# Patient Record
Sex: MALE | Race: BLACK OR AFRICAN AMERICAN | Employment: FULL TIME | URBAN - METROPOLITAN AREA
[De-identification: names, ages, dates, MRNs, and addresses within clinical notes are randomized per-mention and may not be internally consistent; named-entity substitution may affect disease eponyms.]

---

## 2018-02-07 ENCOUNTER — HOSPITAL ENCOUNTER (EMERGENCY)
Age: 28
Discharge: HOME | End: 2018-02-07
Attending: EMERGENCY MEDICINE
Payer: SELF-PAY

## 2018-02-07 VITALS
OXYGEN SATURATION: 100 % | RESPIRATION RATE: 18 BRPM | TEMPERATURE: 98.6 F | SYSTOLIC BLOOD PRESSURE: 120 MMHG | DIASTOLIC BLOOD PRESSURE: 75 MMHG | BODY MASS INDEX: 19.09 KG/M2 | HEIGHT: 78 IN | WEIGHT: 165 LBS | HEART RATE: 83 BPM

## 2018-02-07 DIAGNOSIS — J06.9 VIRAL URI WITH COUGH: Primary | ICD-10-CM

## 2018-02-07 PROCEDURE — 99282 EMERGENCY DEPT VISIT SF MDM: CPT

## 2018-02-07 RX ORDER — ACETAMINOPHEN 325 MG/1
650 TABLET ORAL
Qty: 20 TAB | Refills: 0 | Status: SHIPPED | OUTPATIENT
Start: 2018-02-07 | End: 2019-11-04

## 2018-02-07 RX ORDER — BENZONATATE 100 MG/1
100 CAPSULE ORAL
Qty: 12 CAP | Refills: 0 | Status: SHIPPED | OUTPATIENT
Start: 2018-02-07 | End: 2018-02-14

## 2018-02-07 RX ORDER — LIDOCAINE HYDROCHLORIDE 20 MG/ML
SOLUTION OROPHARYNGEAL
Qty: 200 ML | Refills: 0 | Status: SHIPPED | OUTPATIENT
Start: 2018-02-07 | End: 2019-11-04

## 2018-02-07 RX ORDER — IBUPROFEN 800 MG/1
800 TABLET ORAL
Qty: 20 TAB | Refills: 0 | Status: SHIPPED | OUTPATIENT
Start: 2018-02-07 | End: 2018-02-14

## 2018-02-07 NOTE — ED NOTES
Current Discharge Medication List      START taking these medications    Details   benzonatate (TESSALON PERLES) 100 mg capsule Take 1 Cap by mouth three (3) times daily as needed for Cough for up to 7 days. Qty: 12 Cap, Refills: 0      ibuprofen (MOTRIN) 800 mg tablet Take 1 Tab by mouth every six (6) hours as needed for Pain for up to 7 days. Qty: 20 Tab, Refills: 0      acetaminophen (TYLENOL) 325 mg tablet Take 2 Tabs by mouth every four (4) hours as needed for Pain. Qty: 20 Tab, Refills: 0      lidocaine (LIDOCAINE VISCOUS) 2 % solution Put 10 mL in mouth and swish and spit out QAC and at bedtime  Qty: 200 mL, Refills: 0           Patient armband removed and shredded  Prescriptions given and reviewed with patient.

## 2018-02-07 NOTE — DISCHARGE INSTRUCTIONS
Upper Respiratory Infection (Cold): Care Instructions  Your Care Instructions    An upper respiratory infection, or URI, is an infection of the nose, sinuses, or throat. URIs are spread by coughs, sneezes, and direct contact. The common cold is the most frequent kind of URI. The flu and sinus infections are other kinds of URIs. Almost all URIs are caused by viruses. Antibiotics won't cure them. But you can treat most infections with home care. This may include drinking lots of fluids and taking over-the-counter pain medicine. You will probably feel better in 4 to 10 days. The doctor has checked you carefully, but problems can develop later. If you notice any problems or new symptoms, get medical treatment right away. Follow-up care is a key part of your treatment and safety. Be sure to make and go to all appointments, and call your doctor if you are having problems. It's also a good idea to know your test results and keep a list of the medicines you take. How can you care for yourself at home? · To prevent dehydration, drink plenty of fluids, enough so that your urine is light yellow or clear like water. Choose water and other caffeine-free clear liquids until you feel better. If you have kidney, heart, or liver disease and have to limit fluids, talk with your doctor before you increase the amount of fluids you drink. · Take an over-the-counter pain medicine, such as acetaminophen (Tylenol), ibuprofen (Advil, Motrin), or naproxen (Aleve). Read and follow all instructions on the label. · Before you use cough and cold medicines, check the label. These medicines may not be safe for young children or for people with certain health problems. · Be careful when taking over-the-counter cold or flu medicines and Tylenol at the same time. Many of these medicines have acetaminophen, which is Tylenol. Read the labels to make sure that you are not taking more than the recommended dose.  Too much acetaminophen (Tylenol) can be harmful. · Get plenty of rest.  · Do not smoke or allow others to smoke around you. If you need help quitting, talk to your doctor about stop-smoking programs and medicines. These can increase your chances of quitting for good. When should you call for help? Call 911 anytime you think you may need emergency care. For example, call if:  ? · You have severe trouble breathing. ?Call your doctor now or seek immediate medical care if:  ? · You seem to be getting much sicker. ? · You have new or worse trouble breathing. ? · You have a new or higher fever. ? · You have a new rash. ? Watch closely for changes in your health, and be sure to contact your doctor if:  ? · You have a new symptom, such as a sore throat, an earache, or sinus pain. ? · You cough more deeply or more often, especially if you notice more mucus or a change in the color of your mucus. ? · You do not get better as expected. Where can you learn more? Go to http://susana-han.info/. Enter I022 in the search box to learn more about \"Upper Respiratory Infection (Cold): Care Instructions. \"  Current as of: May 12, 2017  Content Version: 11.4  © 5646-1374 [employer]. Care instructions adapted under license by [employer] (which disclaims liability or warranty for this information). If you have questions about a medical condition or this instruction, always ask your healthcare professional. [place] [address] any warranty or liability for your use of this information.

## 2018-02-07 NOTE — LETTER
[facility] EMERGENCY DEPT 
[address] [place] [address] 
[phone] Work/School Note Date: 2/7/2018 To Whom It May concern: 
 
Chantal Whitehead was seen and treated today in the emergency room by the following provider(s): 
Attending Provider: Pratima Rausch MD 
Physician Assistant: Kathrine Meredith PA-C. Chantal Whitehead may return to work on 2/8/18.  
 
Sincerely, 
 
 
 
 
Demarcus Augustin RN

## 2018-02-07 NOTE — ED NOTES
Patient left ER without written discharge instructions.   Patient received verbal discharge instructions from provider prior to leaving ER

## 2018-02-07 NOTE — ED PROVIDER NOTES
EMERGENCY DEPARTMENT HISTORY AND PHYSICAL EXAM    2:21 PM      Date: 2/7/2018  Patient Name: Justine Jiménez    History of Presenting Illness     Chief Complaint   Patient presents with    Sore Throat    Cough    Nasal Congestion         History Provided By: Patient    Chief Complaint: URI  Duration:  Days  Timing:  Acute  Location: HEENT  Quality: Aching  Severity: Moderate  Modifying Factors: none  Associated Symptoms: denies any other associated signs or symptoms      Additional History (Context):Lenard Villarreal is a 32 y.o. male with a pertinent history of PTX who presents to the emergency department for evaluation of HA, rhinorrhea, sore throat, dry cough, and congestion x 2 days. Kids are sick with flu. No fevers or treatments at home. Pt denies any fevers or chills, headache, dizziness or light headedness, SOB, n/v/d/c, or rash. Patient has no other complaints at this time. PCP:  None        Current Outpatient Prescriptions   Medication Sig Dispense Refill    benzonatate (TESSALON PERLES) 100 mg capsule Take 1 Cap by mouth three (3) times daily as needed for Cough for up to 7 days. 12 Cap 0    ibuprofen (MOTRIN) 800 mg tablet Take 1 Tab by mouth every six (6) hours as needed for Pain for up to 7 days. 20 Tab 0    acetaminophen (TYLENOL) 325 mg tablet Take 2 Tabs by mouth every four (4) hours as needed for Pain. 20 Tab 0    lidocaine (LIDOCAINE VISCOUS) 2 % solution Put 10 mL in mouth and swish and spit out QAC and at bedtime 200 mL 0       Past History     Past Medical History:  Past Medical History:   Diagnosis Date    H/O chest tube placement     Pneumothorax        Past Surgical History:  History reviewed. No pertinent surgical history. Family History:  History reviewed. No pertinent family history.     Social History:  Social History   Substance Use Topics    Smoking status: Never Smoker    Smokeless tobacco: None    Alcohol use No       Allergies:  No Known Allergies      Review of Systems       Review of Systems   Constitutional: Negative for chills and fever. HENT: Positive for congestion, rhinorrhea and sore throat. Respiratory: Positive for cough. Negative for shortness of breath. Cardiovascular: Negative for chest pain. Gastrointestinal: Negative for abdominal pain, constipation, diarrhea, nausea and vomiting. Musculoskeletal: Negative for back pain and myalgias. Skin: Negative for rash and wound. Neurological: Positive for headaches. Negative for dizziness. Physical Exam     Visit Vitals    /75 (BP 1 Location: Left arm, BP Patient Position: At rest)    Pulse 83    Temp 98.6 °F (37 °C)    Resp 18    Ht 6' 6\" (1.981 m)    Wt 74.8 kg (165 lb)    SpO2 100%    BMI 19.07 kg/m2         Physical Exam   Constitutional: He is oriented to person, place, and time. He appears well-developed and well-nourished. No distress. HENT:   Head: Normocephalic and atraumatic. Nose: Mucosal edema and rhinorrhea present. Mouth/Throat: Oropharynx is clear and moist. No oropharyngeal exudate. Eyes: Conjunctivae and EOM are normal. Right eye exhibits no discharge. Left eye exhibits no discharge. Neck: Normal range of motion. Neck supple. No thyromegaly present. Cardiovascular: Normal rate, regular rhythm and normal heart sounds. Pulmonary/Chest: Effort normal and breath sounds normal. No respiratory distress. He has no wheezes. He has no rales. He exhibits no tenderness. Lungs CTAB   Musculoskeletal: Normal range of motion. He exhibits no edema or deformity. Lymphadenopathy:     He has no cervical adenopathy. Neurological: He is alert and oriented to person, place, and time. No cranial nerve deficit. Skin: Skin is warm and dry. No rash noted. He is not diaphoretic. Psychiatric: He has a normal mood and affect. Nursing note and vitals reviewed.         Diagnostic Study Results     Labs -  No results found for this or any previous visit (from the past 12 hour(s)). Radiologic Studies -   No results found. Medical Decision Making   I am the first provider for this patient. I reviewed the vital signs, available nursing notes, past medical history, past surgical history, family history and social history. Vital Signs-Reviewed the patient's vital signs. Pulse Oximetry Analysis -  100% on room air (Interpretation)    Records Reviewed: Nursing Notes and Old Medical Records (Time of Review: 2:21 PM)    ED Course: Progress Notes, Reevaluation, and Consults:    Provider Notes (Medical Decision Making):   Differential Diagnosis:  influenza, mononucleosis, acute bronchitis, URI, streptococcal pharyngitis, pertussis, pneumonia, asthma exacerbation, allergic rhinitis      Plan: Pt presents in NAD, vitals wnl. Exam and HPI c/w uncomplicated viral uri. Will treat symptomatically. At this time, patient is stable and appropriate for discharge home. Patient demonstrates understanding of current diagnoses and is in agreement with the treatment plan. They are advised that while the likelihood of serious underlying condition is low at this point given the evaluation performed today, we cannot fully rule it out. They are advised to immediately return with any new symptoms or worsening of current condition. All questions have been answered. Patient is given educational material regarding their diagnoses, including danger symptoms and when to return to the ED. Diagnosis     Clinical Impression:   1.  Viral URI with cough        Disposition: AL Home    Follow-up Information     Follow up With Details Comments Dinaa Call in 2 days  [facility]    [id number] [place] [address] EMERGENCY DEPT Go to As needed, If symptoms worsen [address] [place] [id number] [phone]           Patient's Medications   Start Taking    ACETAMINOPHEN (TYLENOL) 325 MG TABLET    Take 2 Tabs by mouth every four (4) hours as needed for Pain. BENZONATATE (TESSALON PERLES) 100 MG CAPSULE    Take 1 Cap by mouth three (3) times daily as needed for Cough for up to 7 days. IBUPROFEN (MOTRIN) 800 MG TABLET    Take 1 Tab by mouth every six (6) hours as needed for Pain for up to 7 days.     LIDOCAINE (LIDOCAINE VISCOUS) 2 % SOLUTION    Put 10 mL in mouth and swish and spit out QAC and at bedtime   Continue Taking    No medications on file   These Medications have changed    No medications on file   Stop Taking    CYCLOBENZAPRINE (FLEXERIL) 10 MG TABLET    Take 1 Tab by mouth three (3) times daily as needed for Muscle Spasm(s).     _______________________________

## 2019-11-04 ENCOUNTER — HOSPITAL ENCOUNTER (EMERGENCY)
Age: 29
Discharge: HOME | End: 2019-11-04
Attending: EMERGENCY MEDICINE
Payer: SELF-PAY

## 2019-11-04 VITALS
DIASTOLIC BLOOD PRESSURE: 75 MMHG | HEART RATE: 65 BPM | SYSTOLIC BLOOD PRESSURE: 114 MMHG | BODY MASS INDEX: 18.51 KG/M2 | OXYGEN SATURATION: 100 % | RESPIRATION RATE: 18 BRPM | HEIGHT: 78 IN | TEMPERATURE: 97.6 F | WEIGHT: 160 LBS

## 2019-11-04 DIAGNOSIS — S39.012A BACK STRAIN, INITIAL ENCOUNTER: Primary | ICD-10-CM

## 2019-11-04 PROCEDURE — 99282 EMERGENCY DEPT VISIT SF MDM: CPT

## 2019-11-04 RX ORDER — IBUPROFEN 800 MG/1
TABLET ORAL
Qty: 15 TAB | Refills: 0 | Status: SHIPPED | OUTPATIENT
Start: 2019-11-04

## 2019-11-04 RX ORDER — CYCLOBENZAPRINE HCL 5 MG
10 TABLET ORAL
Qty: 15 TAB | Refills: 0 | Status: SHIPPED | OUTPATIENT
Start: 2019-11-04

## 2019-11-04 NOTE — LETTER
[address] [place] [address] [facility] EMERGENCY DEPT 
[address] [facility] [id number] [phone] Work/School Note Date: 11/4/2019 To Whom It May concern: 
 
Pedro Luis Capps was seen and treated today in the emergency room by the following provider(s): 
Attending Provider: Sukhdev Briggs MD. Pedro Luis Capps may return to work on November 5, 2019.  
 
Sincerely, 
 
 
 
 
Candelario Neumann MD

## 2019-11-04 NOTE — DISCHARGE INSTRUCTIONS

## 2019-11-04 NOTE — ED PROVIDER NOTES
HPI patient complains of mid and lower back soreness after doing some lifting last week. He says is a tight spasming type sensation is worse with bending and movement and better with rest massage and heat. He was scheduled to go to work this morning but when he woke up he said his back was still hurting and he came to the emergency room for another evaluation. He has not taken any medication for the symptoms. He denies any leg pain denies any change in bowel or bladder habits denies any saddle anesthesia. No other complaints at this time    Past Medical History:   Diagnosis Date    H/O chest tube placement     Pneumothorax        History reviewed. No pertinent surgical history. History reviewed. No pertinent family history.     Social History     Socioeconomic History    Marital status: SINGLE     Spouse name: Not on file    Number of children: Not on file    Years of education: Not on file    Highest education level: Not on file   Occupational History    Not on file   Social Needs    Financial resource strain: Not on file    Food insecurity:     Worry: Not on file     Inability: Not on file    Transportation needs:     Medical: Not on file     Non-medical: Not on file   Tobacco Use    Smoking status: Never Smoker   Substance and Sexual Activity    Alcohol use: No    Drug use: No    Sexual activity: Not on file   Lifestyle    Physical activity:     Days per week: Not on file     Minutes per session: Not on file    Stress: Not on file   Relationships    Social connections:     Talks on phone: Not on file     Gets together: Not on file     Attends [employer] service: Not on file     Active member of club or organization: Not on file     Attends meetings of clubs or organizations: Not on file     Relationship status: Not on file    Intimate partner violence:     Fear of current or ex partner: Not on file     Emotionally abused: Not on file     Physically abused: Not on file     Forced sexual activity: Not on file   Other Topics Concern    Not on file   Social History Narrative    Not on file         ALLERGIES: Patient has no known allergies. Review of Systems   Constitutional: Negative. HENT: Negative. Respiratory: Negative. Cardiovascular: Negative. Gastrointestinal: Negative. Genitourinary: Negative. Musculoskeletal: Positive for back pain. Skin: Negative. Neurological: Negative. Psychiatric/Behavioral: Negative. Vitals:    11/04/19 0837   BP: 114/75   Pulse: 65   Resp: 18   Temp: 97.6 °F (36.4 °C)   SpO2: 100%   Weight: 72.6 kg (160 lb)   Height: 6' 6\" (1.981 m)            Physical Exam   Constitutional: He is oriented to person, place, and time. He appears well-developed and well-nourished. HENT:   Head: Normocephalic and atraumatic. Mouth/Throat: Oropharynx is clear and moist.   Eyes: Pupils are equal, round, and reactive to light. EOM are normal.   Neck: Neck supple. Cardiovascular: Normal rate and regular rhythm. Pulmonary/Chest: Effort normal and breath sounds normal.   Abdominal: Soft. Musculoskeletal: Normal range of motion. He exhibits tenderness. Mild tenderness to palpation bilaterally in the paralumbar muscles of the lower back. No bony pain. No swelling or skin changes. Neurological: He is alert and oriented to person, place, and time. Skin: Skin is warm and dry. Psychiatric: He has a normal mood and affect. Nursing note and vitals reviewed. MDM       Procedures    Patient understands and agrees with the disposition and follow-up plan.   Aria Tiwari MD 9:40 AM

## 2019-11-04 NOTE — ED NOTES
Neftali Romero is a 30 y.o. male that was discharged in good condition. The patients diagnosis, condition and treatment were explained to  patient and aftercare instructions were given. The patient verbalized understanding. Patient armband removed and shredded.

## 2021-05-10 ENCOUNTER — OFFICE VISIT (OUTPATIENT)
Dept: FAMILY MEDICINE CLINIC | Facility: CLINIC | Age: 31
End: 2021-05-10

## 2021-05-10 ENCOUNTER — HOSPITAL ENCOUNTER (OUTPATIENT)
Dept: LAB | Age: 31
Discharge: HOME | End: 2021-05-10

## 2021-05-10 VITALS
SYSTOLIC BLOOD PRESSURE: 122 MMHG | TEMPERATURE: 97 F | HEART RATE: 71 BPM | BODY MASS INDEX: 19.32 KG/M2 | OXYGEN SATURATION: 99 % | HEIGHT: 78 IN | DIASTOLIC BLOOD PRESSURE: 75 MMHG | WEIGHT: 167 LBS | RESPIRATION RATE: 16 BRPM

## 2021-05-10 DIAGNOSIS — Z00.00 ROUTINE GENERAL MEDICAL EXAMINATION AT A HEALTH CARE FACILITY: Primary | ICD-10-CM

## 2021-05-10 DIAGNOSIS — Z00.00 ROUTINE GENERAL MEDICAL EXAMINATION AT A HEALTH CARE FACILITY: ICD-10-CM

## 2021-05-10 LAB
ALBUMIN SERPL-MCNC: 4.2 G/DL (ref 3.4–5)
ALBUMIN/GLOB SERPL: 1.2 {RATIO} (ref 0.8–1.7)
ALP SERPL-CCNC: 73 U/L (ref 45–117)
ALT SERPL-CCNC: 19 U/L (ref 16–61)
ANION GAP SERPL CALC-SCNC: 5 MMOL/L (ref 3–18)
AST SERPL-CCNC: 16 U/L (ref 10–38)
BASOPHILS # BLD: 0 K/UL (ref 0–0.1)
BASOPHILS NFR BLD: 0 % (ref 0–2)
BILIRUB SERPL-MCNC: 0.6 MG/DL (ref 0.2–1)
BUN SERPL-MCNC: 12 MG/DL (ref 7–18)
BUN/CREAT SERPL: 15 (ref 12–20)
CALCIUM SERPL-MCNC: 8.9 MG/DL (ref 8.5–10.1)
CHLORIDE SERPL-SCNC: 105 MMOL/L (ref 100–111)
CHOLEST SERPL-MCNC: 202 MG/DL
CO2 SERPL-SCNC: 31 MMOL/L (ref 21–32)
CREAT SERPL-MCNC: 0.8 MG/DL (ref 0.6–1.3)
DIFFERENTIAL METHOD BLD: ABNORMAL
EOSINOPHIL # BLD: 0.1 K/UL (ref 0–0.4)
EOSINOPHIL NFR BLD: 1 % (ref 0–5)
ERYTHROCYTE [DISTWIDTH] IN BLOOD BY AUTOMATED COUNT: 12.3 % (ref 11.6–14.5)
EST. AVERAGE GLUCOSE BLD GHB EST-MCNC: 97 MG/DL
GLOBULIN SER CALC-MCNC: 3.6 G/DL (ref 2–4)
GLUCOSE SERPL-MCNC: 84 MG/DL (ref 74–99)
HBA1C MFR BLD: 5 % (ref 4.2–5.6)
HCT VFR BLD AUTO: 40.9 % (ref 36–48)
HDLC SERPL-MCNC: 81 MG/DL (ref 40–60)
HDLC SERPL: 2.5 {RATIO} (ref 0–5)
HGB BLD-MCNC: 13.6 G/DL (ref 13–16)
LDLC SERPL CALC-MCNC: 100.2 MG/DL (ref 0–100)
LIPID PROFILE,FLP: ABNORMAL
LYMPHOCYTES # BLD: 1.2 K/UL (ref 0.9–3.6)
LYMPHOCYTES NFR BLD: 35 % (ref 21–52)
MCH RBC QN AUTO: 28 PG (ref 24–34)
MCHC RBC AUTO-ENTMCNC: 33.3 G/DL (ref 31–37)
MCV RBC AUTO: 84.2 FL (ref 74–97)
MONOCYTES # BLD: 0.2 K/UL (ref 0.05–1.2)
MONOCYTES NFR BLD: 7 % (ref 3–10)
NEUTS SEG # BLD: 1.9 K/UL (ref 1.8–8)
NEUTS SEG NFR BLD: 56 % (ref 40–73)
PLATELET # BLD AUTO: 246 K/UL (ref 135–420)
PMV BLD AUTO: 9.8 FL (ref 9.2–11.8)
POTASSIUM SERPL-SCNC: 4.1 MMOL/L (ref 3.5–5.5)
PROT SERPL-MCNC: 7.8 G/DL (ref 6.4–8.2)
RBC # BLD AUTO: 4.86 M/UL (ref 4.35–5.65)
SODIUM SERPL-SCNC: 141 MMOL/L (ref 136–145)
TRIGL SERPL-MCNC: 104 MG/DL (ref ?–150)
TSH SERPL DL<=0.05 MIU/L-ACNC: 3.75 UIU/ML (ref 0.36–3.74)
VLDLC SERPL CALC-MCNC: 20.8 MG/DL
WBC # BLD AUTO: 3.5 K/UL (ref 4.6–13.2)

## 2021-05-10 PROCEDURE — 83036 HEMOGLOBIN GLYCOSYLATED A1C: CPT

## 2021-05-10 PROCEDURE — 36415 COLL VENOUS BLD VENIPUNCTURE: CPT

## 2021-05-10 PROCEDURE — 85025 COMPLETE CBC W/AUTO DIFF WBC: CPT

## 2021-05-10 PROCEDURE — 80053 COMPREHEN METABOLIC PANEL: CPT

## 2021-05-10 PROCEDURE — 84443 ASSAY THYROID STIM HORMONE: CPT

## 2021-05-10 PROCEDURE — 80061 LIPID PANEL: CPT

## 2021-05-10 PROCEDURE — 87389 HIV-1 AG W/HIV-1&-2 AB AG IA: CPT

## 2021-05-10 PROCEDURE — 99202 OFFICE O/P NEW SF 15 MIN: CPT | Performed by: FAMILY MEDICINE

## 2021-05-10 NOTE — PROGRESS NOTES
REILLY Romero is a 26 y.o. male being seen today for   Chief Complaint   Patient presents with    Physical   IOV for this pt to care a Lesli Cancer.  he states that he thinks he is healthy. Has not seen a doctor in several years and just wanting to check in. He does not eat red meat or pork. Limits fried foots. Does eat veggies. His job Is very active. He sleeps well. He thinks his metnal health is pretty good. Healthy marriage. 4 kids. He had a spontaneous pneumothorax in 2012. Family is healthy. No diabetes or cancer in his close relatives. He has not had covid vaccine but he did have covid in January. Mild symptoms and he fully recovered. Past Medical History:   Diagnosis Date    H/O chest tube placement     Pneumothorax          ROS  Patient states that he is feeling well. Denies complaints of chest pain, shortness of breath, swelling of legs, dizziness or weakness. he denies nausea, vomiting or diarrhea. Current Outpatient Medications   Medication Sig    ibuprofen (MOTRIN) 800 mg tablet 1 tablet by mouth every 8 hours as needed for back pain    cyclobenzaprine (FLEXERIL) 5 mg tablet Take 2 Tabs by mouth three (3) times daily as needed for Muscle Spasm(s). No current facility-administered medications for this visit. PE  Visit Vitals  /75 (BP 1 Location: Left upper arm, BP Patient Position: Sitting, BP Cuff Size: Adult long)   Pulse 71   Temp 97 °F (36.1 °C) (Temporal)   Resp 16   Ht 6' 6\" (1.981 m)   Wt 167 lb (75.8 kg)   SpO2 99%   BMI 19.30 kg/m²        Alert and oriented with normal mood and affect. he is well developed and well nourished . Lungs are clear without wheezing. Heart rate is regular without murmurs or gallops. There is no lower extremity edema. Thyroid with no nodules. TMs clear. Bilateral DP pulses normal.           Assessment and Plan:        ICD-10-CM ICD-9-CM    1.  Routine general medical examination at a health care facility  Z00.00 V70.0 LIPID PANEL      HEMOGLOBIN A1C WITH EAG      TSH 3RD GENERATION      METABOLIC PANEL, COMPREHENSIVE      CBC WITH AUTOMATED DIFF      HIV 1/2 AG/AB, 4TH GENERATION,W RFLX CONFIRM     Routine labs today  Reinforced healthy behaviors.    He will consider covid vaccine      Jose Skelton MD

## 2021-05-12 LAB
HIV 1+2 AB+HIV1 P24 AG SERPL QL IA: NONREACTIVE
HIV12 RESULT COMMENT, HHIVC: NORMAL